# Patient Record
Sex: FEMALE | Race: WHITE
[De-identification: names, ages, dates, MRNs, and addresses within clinical notes are randomized per-mention and may not be internally consistent; named-entity substitution may affect disease eponyms.]

---

## 2018-06-03 ENCOUNTER — HOSPITAL ENCOUNTER (EMERGENCY)
Dept: HOSPITAL 25 - UCCORT | Age: 58
Discharge: HOME | End: 2018-06-03
Payer: COMMERCIAL

## 2018-06-03 VITALS — DIASTOLIC BLOOD PRESSURE: 80 MMHG | SYSTOLIC BLOOD PRESSURE: 134 MMHG

## 2018-06-03 DIAGNOSIS — N39.0: Primary | ICD-10-CM

## 2018-06-03 DIAGNOSIS — Z82.49: ICD-10-CM

## 2018-06-03 DIAGNOSIS — Z98.84: ICD-10-CM

## 2018-06-03 DIAGNOSIS — Z87.891: ICD-10-CM

## 2018-06-03 PROCEDURE — 87077 CULTURE AEROBIC IDENTIFY: CPT

## 2018-06-03 PROCEDURE — 99212 OFFICE O/P EST SF 10 MIN: CPT

## 2018-06-03 PROCEDURE — 87186 SC STD MICRODIL/AGAR DIL: CPT

## 2018-06-03 PROCEDURE — 81003 URINALYSIS AUTO W/O SCOPE: CPT

## 2018-06-03 PROCEDURE — 87086 URINE CULTURE/COLONY COUNT: CPT

## 2018-06-03 PROCEDURE — G0463 HOSPITAL OUTPT CLINIC VISIT: HCPCS

## 2018-06-03 NOTE — UC
Complaint Female HPI





- HPI Summary


HPI Summary: 





Patient has had urinary burning and frequency for the past 3 days.


has taken azo without relief





- History Of Current Complaint


Chief Complaint: UCGU


Stated Complaint: URINARY


Time Seen by Provider: 06/03/18 14:24


Hx Obtained From: Patient


Pregnant?: No


Onset/Duration: Sudden Onset, Lasting Days


Timing: Intermittent


Severity Initially: Mild


Severity Currently: None


Pain Intensity: 0


Aggravating Factor(s): Urination





- Allergies/Home Medications


Allergies/Adverse Reactions: 


 Allergies











Allergy/AdvReac Type Severity Reaction Status Date / Time


 


No Known Allergies Allergy   Verified 06/03/18 14:19














PMH/Surg Hx/FS Hx/Imm Hx


Previously Healthy: Yes





- Surgical History


Surgical History: Yes


Surgery Procedure, Year, and Place: 2 YRS OF AGE TONSILLECTOMY- [facility].  1985 

RIGHT BUNIONECTOMY- [facility].  9/16/15 GASTRIC BYPASS, HERNIA REPAIR





- Family History


Known Family History: Positive: Hypertension





- Social History


Alcohol Use: None


Substance Use Type: None


Smoking Status (MU): Former Smoker


Type: Cigarettes


Amount Used/How Often: 1 1/2 PPD FOR 35 YRS


Length of Time of Smoking/Using Tobacco: 35 YRS


Have You Smoked in the Last Year: No


When Did the Patient Quit Smoking/Using Tobacco: 2007





- Immunization History


Most Recent Influenza Vaccination: 2014


Most Recent Tetanus Shot: UTD


Most Recent Pneumonia Vaccination: NONE





Physical Exam


Vital Signs: 


 Initial Vital Signs











Temp  97.9 F   06/03/18 14:16


 


Pulse  80   06/03/18 14:16


 


Resp  16   06/03/18 14:16


 


BP  134/80   06/03/18 14:16


 


Pulse Ox  100   06/03/18 14:16














 Complaint Female Dx





- Course


Course Of Treatment: hx obtained, exam performed ,meds reviewed, UA pos, treated





- Differential Dx/Diagnosis


Differential Diagnosis/HQI/PQRI: Ureteral Stone, Urinary Tract Infection


Provider Diagnoses: UTI





Discharge





- Sign-Out/Discharge


Documenting (check all that apply): Discharge/Admit/Transfer





- Discharge Plan


Condition: Stable


Disposition: HOME


Prescriptions: 


Cephalexin CAP* [Keflex CAP*] 500 mg PO BID #14 cap


Referrals: 


Sheila Boyle MD [Primary Care Provider] - 


Additional Instructions: 


1. Increase fluid intake 


2. Take medication as prescribed.


3. Follow up if symtpoms are not improving.


4. Culture was sent and we will notify you if treatment needs to be altered.





- Billing Disposition and Condition


Condition: STABLE


Disposition: HOME